# Patient Record
Sex: MALE | Race: WHITE | NOT HISPANIC OR LATINO | Employment: FULL TIME | URBAN - METROPOLITAN AREA
[De-identification: names, ages, dates, MRNs, and addresses within clinical notes are randomized per-mention and may not be internally consistent; named-entity substitution may affect disease eponyms.]

---

## 2018-01-12 NOTE — MISCELLANEOUS
Message   Recorded as Task   Date: 01/19/2016 11:13 AM, Created By: Gloria Zabala   Task Name: Miscellaneous   Assigned To: Sneha Saunders   Regarding Patient: Wally Gomez, Status: Active   CommentFilomena Smoke - 19 Jan 2016 11:13 AM     TASK CREATED  Pt lm to cx his LESI w/ you on 1/21, he cannot make it due to work, he will c b to r/s  Thank you   Hubert Alba - 19 Jan 2016 11:19 AM     TASK REPLIED TO: Previously Assigned To Hubert Alba  aware        Active Problems    1  Herniated lumbar disc without myelopathy (722 10) (M51 26)   2  Low back pain (724 2) (M54 5)   3  Pain in thoracic spine (724 1) (M54 6)   4  Spasm (781 0) (R25 2)    Current Meds   1  Azithromycin 250 MG Oral Tablet; Therapy: 93UVH3377 to (Last Rx:32Lsv3956)  Requested for: 28XFJ7927 Ordered   2  Gabapentin 100 MG Oral Capsule; Take one tablet at bedtime x4 days, then take 2   tablets at bedtime x4 days, then 3 tablets at bedtime; Therapy: 58GOE1428 to (Evaluate:29Qon4483)  Requested for: 12Jan2016; Last   Rx:12Jan2016 Ordered   3  Hydrocodone-Acetaminophen 7 5-500 MG TABS; Therapy: 79GJS1176 to (Last Rx:93Qjo2367)  Requested for: 12WZJ5312 Ordered   4  Hydrocodone-Acetaminophen 7 5-750 MG TABS; Therapy: 00GCY1929 to (Last Nena Oz)  Requested for: 23Nov2011 Ordered   5  Methocarbamol 750 MG Oral Tablet; Take 1 po tid prn spasms; Therapy: 34EZI7877 to (Evaluate:54Jgs2497)  Requested for: 02DRM2536; Last   Rx:13Jan2015 Ordered   6  Naproxen 500 MG Oral Tablet; Therapy: 83NYE8545 to (Last Rx:02Nov2011)  Requested for: 46IOD6775 Ordered   7  Pantoprazole Sodium 40 MG Oral Tablet Delayed Release; Therapy: 05RGD9317 to (Last Rx:30Nov2011)  Requested for: 13VXP8125 Ordered   8  Zolpidem Tartrate 10 MG Oral Tablet; Therapy: 76UAJ4351 to (Last Rx:30Nov2011)  Requested for: 18RXI6040 Ordered    Allergies    1  Amoxicillin TABS   2   Penicillins    Signatures   Electronically signed by : Kiko Montano, ; Jan 19 2016 11: 23AM EST                       (Author)

## 2023-07-18 ENCOUNTER — CONSULT (OUTPATIENT)
Dept: PAIN MEDICINE | Facility: CLINIC | Age: 43
End: 2023-07-18
Payer: COMMERCIAL

## 2023-07-18 VITALS
HEART RATE: 79 BPM | SYSTOLIC BLOOD PRESSURE: 130 MMHG | TEMPERATURE: 98.1 F | BODY MASS INDEX: 32.08 KG/M2 | WEIGHT: 250 LBS | DIASTOLIC BLOOD PRESSURE: 86 MMHG | HEIGHT: 74 IN

## 2023-07-18 DIAGNOSIS — M54.12 CERVICAL RADICULOPATHY: ICD-10-CM

## 2023-07-18 DIAGNOSIS — M48.02 FORAMINAL STENOSIS OF CERVICAL REGION: Primary | ICD-10-CM

## 2023-07-18 PROCEDURE — 99204 OFFICE O/P NEW MOD 45 MIN: CPT | Performed by: ANESTHESIOLOGY

## 2023-07-18 RX ORDER — ZOLPIDEM TARTRATE 12.5 MG/1
10 TABLET, FILM COATED, EXTENDED RELEASE ORAL
COMMUNITY

## 2023-07-18 RX ORDER — BUPROPION HYDROCHLORIDE 300 MG/1
300 TABLET ORAL DAILY
COMMUNITY

## 2023-07-18 RX ORDER — CETIRIZINE HYDROCHLORIDE 10 MG/1
10 TABLET ORAL DAILY
COMMUNITY

## 2023-07-18 RX ORDER — ESOMEPRAZOLE MAGNESIUM 20 MG/1
20 FOR SUSPENSION ORAL
COMMUNITY

## 2023-07-18 NOTE — PROGRESS NOTES
Assessment  1. Foraminal stenosis of cervical region    2. Cervical radiculopathy        Plan    The patient's pain persists despite time, relative rest, activity modification and practic treatment. I believe that Beth Addison may benefit from cervical epidural steroid injection to directly diminish any inflammatory component of his pain. I will initially use an translaminar approach. If the patient does not receive significant pain relief following the initial injection, I may need to repeat using a transforaminal approach that may allow for better concentrate of the steroid along the affected nerve root. In the office today, we reviewed the nature of the patient's pathology in depth using diagrams and models. I discussed the approach I would use for the epidural steroid injection and provided literature for home review. The patient understands the risks associated with the procedure including but not limited to bleeding, infection, tissue injury, exacerbation of symptoms, allergic reaction, decreased immunity secondary to steroid, spinal headache, and paralysis and provided verbal consent today. My impressions and treatment recommendations were discussed in detail with the patient who verbalized understanding and had no further questions. Discharge instructions were provided. I personally saw and examined the patient and I agree with the above discussed plan of care. This note is created using dictation transcription. It may contain typographical errors, grammatical errors, improperly dictated words, background noise and other errors. Orders Placed This Encounter   Procedures   • FL spine and pain procedure     Standing Status:   Future     Standing Expiration Date:   7/18/2027     Order Specific Question:   Reason for Exam:     Answer:   MASOOD to the right     Order Specific Question:   Anticoagulant hold needed?      Answer:   no   • FL spine and pain procedure     Standing Status:   Future     Standing Expiration Date:   7/18/2027     Order Specific Question:   Reason for Exam:     Answer:   MASOOD to the right 2     Order Specific Question:   Anticoagulant hold needed? Answer:   no     New Medications Ordered This Visit   Medications   • esomeprazole (NexIUM) 20 mg packet     Sig: Take 20 mg by mouth every morning before breakfast   • sertraline (ZOLOFT) 50 mg tablet     Sig: Take 50 mg by mouth daily   • buPROPion (WELLBUTRIN XL) 300 mg 24 hr tablet     Sig: Take 300 mg by mouth daily   • cetirizine (ZyrTEC) 10 mg tablet     Sig: Take 10 mg by mouth daily   • zolpidem (AMBIEN CR) 12.5 MG CR tablet     Sig: Take 10 mg by mouth daily at bedtime as needed for sleep       History of Present Illness    Tucker Jones is a 41 y.o. male with 9 to 10-month history of right-sided shoulder pain with numbness tingling down his right arm. Unaware of any clear present event denies any trauma or injury. Is undergone chiropractic treatment but his pain is moderate to severe he rates as 8 out of 10 the visual analog scale interfering with his daily living activities. His pain is intermittent described as sharp with a pins-and-needles and pressure sensation. He denies any weakness. Lying down decreases symptoms while standing and sitting aggravates his pain. I have personally reviewed and/or updated the patient's past medical history, past surgical history, family history, social history, current medications, allergies, and vital signs today. Review of Systems   Full system review was negative    There is no problem list on file for this patient. Past Medical History:   Diagnosis Date   • Anxiety    • GERD (gastroesophageal reflux disease)        History reviewed. No pertinent surgical history. History reviewed. No pertinent family history.     Social History     Occupational History   • Not on file   Tobacco Use   • Smoking status: Never   • Smokeless tobacco: Never   Substance and Sexual Activity   • Alcohol use: Yes   • Drug use: Not on file   • Sexual activity: Not on file       Current Outpatient Medications on File Prior to Visit   Medication Sig   • buPROPion (WELLBUTRIN XL) 300 mg 24 hr tablet Take 300 mg by mouth daily   • cetirizine (ZyrTEC) 10 mg tablet Take 10 mg by mouth daily   • esomeprazole (NexIUM) 20 mg packet Take 20 mg by mouth every morning before breakfast   • sertraline (ZOLOFT) 50 mg tablet Take 50 mg by mouth daily   • zolpidem (AMBIEN CR) 12.5 MG CR tablet Take 10 mg by mouth daily at bedtime as needed for sleep     No current facility-administered medications on file prior to visit. Allergies   Allergen Reactions   • Penicillins Rash       Physical Exam    /86 (BP Location: Left arm, Patient Position: Sitting, Cuff Size: Standard)   Pulse 79   Temp 98.1 °F (36.7 °C)   Ht 6' 2" (1.88 m)   Wt 113 kg (250 lb)   BMI 32.10 kg/m²     Constitutional: normal, well developed, well nourished, alert, in no distress and non-toxic and no overt pain behavior. Eyes: anicteric  HEENT: grossly intact  Neck: supple, symmetric, trachea midline and no masses   Pulmonary:even and unlabored  Cardiovascular:No edema or pitting edema present  Skin:Normal without rashes or lesions and well hydrated  Psychiatric:Mood and affect appropriate  Neurologic:Cranial Nerves II-XII grossly intact  Musculoskeletal:normal,  Inspection: Normal station and gait. Normal cervical curves and head posture. Skin intact without erythema. No sensory loss. There is no atrophy. Palpation: There is no tenderness to palpation overlying the bilateral cervical paraspinals, cervical facet joints. There is no tenderness over the upper trapezius muscles bilateral. No shoulder tenderness  Motor/Strength: Equal strength in the bilateral upper extremities  Reflexes: equal and symmetric in the upper limbs. Sensation: Sensation intact to light touch and pinprick in the upper limbs.    Maneuvers: Positive right Spurling's maneuver. Negative Lhermitte's sign. Imaging    MRI CERVICAL SPINE @ [facility] 6-28-23  Impression:     Multilevel cervical spondylosis     Spinal canal narrowing greatest at C5-C6 where there is a mild to moderate   spinal canal stenosis and mild cord flattening. No yandy cord compression or   cord edema seen. Multilevel high-grade neural foraminal stenoses including severe left and   moderate to severe right C4-C5, and severe bilateral C5-C6 neural foraminal   stenoses. Question of impingement of the exiting right C7 nerve root by a right   foraminal protrusion. Moderate to severe left C6-C7 neural foraminal stenosis. Suspected discogenic edema eccentric to the left at C5-C6     I have personally reviewed pertinent films in PACS and my interpretation is To level foraminal stenosis with spondylosis.

## 2023-08-02 ENCOUNTER — TELEPHONE (OUTPATIENT)
Age: 43
End: 2023-08-02

## 2023-08-02 NOTE — TELEPHONE ENCOUNTER
Caller: patient    Doctor: Ramandeep Frazier    Reason for call: patient would like to cancel procedure.  Will call back to reschedule    Call back#:

## 2023-09-22 ENCOUNTER — APPOINTMENT (EMERGENCY)
Dept: RADIOLOGY | Facility: HOSPITAL | Age: 43
End: 2023-09-22
Payer: COMMERCIAL

## 2023-09-22 ENCOUNTER — APPOINTMENT (EMERGENCY)
Dept: CT IMAGING | Facility: HOSPITAL | Age: 43
End: 2023-09-22
Payer: COMMERCIAL

## 2023-09-22 ENCOUNTER — HOSPITAL ENCOUNTER (EMERGENCY)
Facility: HOSPITAL | Age: 43
Discharge: HOME | End: 2023-09-22
Attending: EMERGENCY MEDICINE
Payer: COMMERCIAL

## 2023-09-22 VITALS
TEMPERATURE: 98.8 F | OXYGEN SATURATION: 94 % | DIASTOLIC BLOOD PRESSURE: 80 MMHG | HEART RATE: 76 BPM | RESPIRATION RATE: 18 BRPM | BODY MASS INDEX: 32.08 KG/M2 | WEIGHT: 250 LBS | HEIGHT: 74 IN | SYSTOLIC BLOOD PRESSURE: 122 MMHG

## 2023-09-22 DIAGNOSIS — M21.822 HILL SACHS DEFORMITY, LEFT: ICD-10-CM

## 2023-09-22 DIAGNOSIS — S43.006A SHOULDER DISLOCATION: Primary | ICD-10-CM

## 2023-09-22 LAB
ABO GROUP BLD: NORMAL
ABO GROUP BLD: NORMAL
ALBUMIN SERPL BCP-MCNC: 4.8 G/DL (ref 3.5–5)
ALP SERPL-CCNC: 62 U/L (ref 34–104)
ALT SERPL W P-5'-P-CCNC: 33 U/L (ref 7–52)
ANION GAP SERPL CALCULATED.3IONS-SCNC: 10 MMOL/L
AST SERPL W P-5'-P-CCNC: 44 U/L (ref 13–39)
BASOPHILS # BLD AUTO: 0.04 THOUSANDS/ÂΜL (ref 0–0.1)
BASOPHILS NFR BLD AUTO: 0 % (ref 0–1)
BILIRUB SERPL-MCNC: 0.53 MG/DL (ref 0.2–1)
BLD GP AB SCN SERPL QL: NEGATIVE
BUN SERPL-MCNC: 13 MG/DL (ref 5–25)
CALCIUM SERPL-MCNC: 9.4 MG/DL (ref 8.4–10.2)
CHLORIDE SERPL-SCNC: 98 MMOL/L (ref 96–108)
CO2 SERPL-SCNC: 26 MMOL/L (ref 21–32)
CREAT SERPL-MCNC: 1.11 MG/DL (ref 0.6–1.3)
EOSINOPHIL # BLD AUTO: 0.11 THOUSAND/ÂΜL (ref 0–0.61)
EOSINOPHIL NFR BLD AUTO: 1 % (ref 0–6)
ERYTHROCYTE [DISTWIDTH] IN BLOOD BY AUTOMATED COUNT: 11.7 % (ref 11.6–15.1)
GFR SERPL CREATININE-BSD FRML MDRD: 82 ML/MIN/1.73SQ M
GLUCOSE SERPL-MCNC: 99 MG/DL (ref 65–140)
HCT VFR BLD AUTO: 40 % (ref 36.5–49.3)
HGB BLD-MCNC: 14.4 G/DL (ref 12–17)
IMM GRANULOCYTES # BLD AUTO: 0.06 THOUSAND/UL (ref 0–0.2)
IMM GRANULOCYTES NFR BLD AUTO: 1 % (ref 0–2)
LYMPHOCYTES # BLD AUTO: 2.71 THOUSANDS/ÂΜL (ref 0.6–4.47)
LYMPHOCYTES NFR BLD AUTO: 24 % (ref 14–44)
MCH RBC QN AUTO: 31.7 PG (ref 26.8–34.3)
MCHC RBC AUTO-ENTMCNC: 36 G/DL (ref 31.4–37.4)
MCV RBC AUTO: 88 FL (ref 82–98)
MONOCYTES # BLD AUTO: 0.64 THOUSAND/ÂΜL (ref 0.17–1.22)
MONOCYTES NFR BLD AUTO: 6 % (ref 4–12)
NEUTROPHILS # BLD AUTO: 7.67 THOUSANDS/ÂΜL (ref 1.85–7.62)
NEUTS SEG NFR BLD AUTO: 68 % (ref 43–75)
NRBC BLD AUTO-RTO: 0 /100 WBCS
PLATELET # BLD AUTO: 306 THOUSANDS/UL (ref 149–390)
PMV BLD AUTO: 9.8 FL (ref 8.9–12.7)
POTASSIUM SERPL-SCNC: 4 MMOL/L (ref 3.5–5.3)
PROT SERPL-MCNC: 7.6 G/DL (ref 6.4–8.4)
RBC # BLD AUTO: 4.54 MILLION/UL (ref 3.88–5.62)
RH BLD: POSITIVE
RH BLD: POSITIVE
SODIUM SERPL-SCNC: 134 MMOL/L (ref 135–147)
SPECIMEN EXPIRATION DATE: NORMAL
WBC # BLD AUTO: 11.23 THOUSAND/UL (ref 4.31–10.16)

## 2023-09-22 PROCEDURE — 99284 EMERGENCY DEPT VISIT MOD MDM: CPT

## 2023-09-22 PROCEDURE — 74177 CT ABD & PELVIS W/CONTRAST: CPT

## 2023-09-22 PROCEDURE — 71045 X-RAY EXAM CHEST 1 VIEW: CPT

## 2023-09-22 PROCEDURE — 80053 COMPREHEN METABOLIC PANEL: CPT

## 2023-09-22 PROCEDURE — 72125 CT NECK SPINE W/O DYE: CPT

## 2023-09-22 PROCEDURE — 36415 COLL VENOUS BLD VENIPUNCTURE: CPT

## 2023-09-22 PROCEDURE — 70450 CT HEAD/BRAIN W/O DYE: CPT

## 2023-09-22 PROCEDURE — 90471 IMMUNIZATION ADMIN: CPT

## 2023-09-22 PROCEDURE — 71260 CT THORAX DX C+: CPT

## 2023-09-22 PROCEDURE — 85025 COMPLETE CBC W/AUTO DIFF WBC: CPT

## 2023-09-22 PROCEDURE — 86850 RBC ANTIBODY SCREEN: CPT

## 2023-09-22 PROCEDURE — 99285 EMERGENCY DEPT VISIT HI MDM: CPT

## 2023-09-22 PROCEDURE — 86900 BLOOD TYPING SEROLOGIC ABO: CPT

## 2023-09-22 PROCEDURE — 96374 THER/PROPH/DIAG INJ IV PUSH: CPT

## 2023-09-22 PROCEDURE — 90715 TDAP VACCINE 7 YRS/> IM: CPT

## 2023-09-22 PROCEDURE — 73020 X-RAY EXAM OF SHOULDER: CPT

## 2023-09-22 PROCEDURE — 23650 CLTX SHO DSLC W/MNPJ WO ANES: CPT

## 2023-09-22 PROCEDURE — 86901 BLOOD TYPING SEROLOGIC RH(D): CPT

## 2023-09-22 PROCEDURE — 73030 X-RAY EXAM OF SHOULDER: CPT

## 2023-09-22 RX ORDER — HYDROMORPHONE HCL/PF 1 MG/ML
0.5 SYRINGE (ML) INJECTION ONCE
Status: COMPLETED | OUTPATIENT
Start: 2023-09-22 | End: 2023-09-22

## 2023-09-22 RX ORDER — MORPHINE SULFATE 4 MG/ML
4 INJECTION, SOLUTION INTRAMUSCULAR; INTRAVENOUS ONCE
Status: DISCONTINUED | OUTPATIENT
Start: 2023-09-22 | End: 2023-09-22

## 2023-09-22 RX ORDER — SODIUM CHLORIDE 9 MG/ML
3 INJECTION INTRAVENOUS
Status: DISCONTINUED | OUTPATIENT
Start: 2023-09-22 | End: 2023-09-22

## 2023-09-22 RX ORDER — NITROGLYCERIN 0.4 MG/1
0.4 TABLET SUBLINGUAL
Status: DISCONTINUED | OUTPATIENT
Start: 2023-09-22 | End: 2023-09-22

## 2023-09-22 RX ADMIN — IOHEXOL 100 ML: 350 INJECTION, SOLUTION INTRAVENOUS at 19:22

## 2023-09-22 RX ADMIN — TETANUS TOXOID, REDUCED DIPHTHERIA TOXOID AND ACELLULAR PERTUSSIS VACCINE, ADSORBED 0.5 ML: 5; 2.5; 8; 8; 2.5 SUSPENSION INTRAMUSCULAR at 19:38

## 2023-09-22 RX ADMIN — HYDROMORPHONE HYDROCHLORIDE 0.5 MG: 1 INJECTION, SOLUTION INTRAMUSCULAR; INTRAVENOUS; SUBCUTANEOUS at 19:12
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
## 2023-09-23 NOTE — ED ATTENDING ATTESTATION
9/22/2023  I, Chaim Suárez MD, saw and evaluated the patient. I have discussed the patient with the resident/non-physician practitioner and agree with the resident's/non-physician practitioner's findings, Plan of Care, and MDM as documented in the resident's/non-physician practitioner's note, except where noted. All available labs and Radiology studies were reviewed. I was present for key portions of any procedure(s) performed by the resident/non-physician practitioner and I was immediately available to provide assistance. At this point I agree with the current assessment done in the Emergency Department. I have conducted an independent evaluation of this patient a history and physical is as follows: On examination:  The patient is awake, alert and oriented  HEENT: Normocephalic/atraumatic  External examination of the ears is unremarkable  Pupils are equal round and reactive to light, there is no conjunctival injection or scleral icterus noted  Nares are patent without rhinorrhea. The oropharynx is moist without injection  The neck is supple  Lungs: Equal chest rise  Heart: Good peripheral perfusion  Abdomen: Non distended  Musculoskeletal: Obvious deformity of left shoulder with decreased mobility  Neuro: Nonfocal exam  Skin: No rash noted  Psych: Mood and affect normal      ED Course   Shoulder successfully reduced by me in the emergency department. Scans demonstrated no further injuries. Patient appeared clinically well.     Critical Care Time  Procedures

## 2023-09-23 NOTE — DISCHARGE INSTRUCTIONS
Keep your arm in sling until cleared by orthopedics. A referral for [facility]  ortho was placed. You have a Hill-Sachs deformity which is a type of fracture related to shoulder dislocation. Take tylenol and motrin as needed for pain.

## 2023-09-26 ENCOUNTER — OFFICE VISIT (OUTPATIENT)
Dept: OBGYN CLINIC | Facility: CLINIC | Age: 43
End: 2023-09-26
Payer: COMMERCIAL

## 2023-09-26 VITALS
HEIGHT: 74 IN | BODY MASS INDEX: 32.1 KG/M2 | HEART RATE: 98 BPM | SYSTOLIC BLOOD PRESSURE: 158 MMHG | DIASTOLIC BLOOD PRESSURE: 89 MMHG

## 2023-09-26 DIAGNOSIS — S43.006A SHOULDER DISLOCATION: Primary | ICD-10-CM

## 2023-09-26 DIAGNOSIS — T15.90XA FOREIGN BODY IN EYE, UNSPECIFIED LATERALITY, INITIAL ENCOUNTER: ICD-10-CM

## 2023-09-26 DIAGNOSIS — M21.822 HILL SACHS DEFORMITY, LEFT: ICD-10-CM

## 2023-09-26 PROCEDURE — 99203 OFFICE O/P NEW LOW 30 MIN: CPT | Performed by: ORTHOPAEDIC SURGERY

## 2023-09-26 NOTE — PROGRESS NOTES
Assessment/Plan:   Diagnoses and all orders for this visit:    Shoulder dislocation  -     Ambulatory Referral to Orthopedic Surgery  -     MRI shoulder left wo contrast; Future  -     XR orbits for foreign body; Future    Hill Sachs deformity, left  -     Ambulatory Referral to Orthopedic Surgery  -     MRI shoulder left wo contrast; Future    Foreign body in eye, unspecified laterality, initial encounter  -     XR orbits for foreign body; Future         Reviewed physical exam and imaging with patient at time of visit. The patient experienced a left shoulder dislocation on 9/22/23, his symptoms are consistent with a possible rotator cuff tear and a labral tear of his left shoulder. An MRI was ordered for further evaluation. He was instructed to return to wearing the sling. He was advised to use his right arm only while working. He should refrain from active range of motion. He will follow-up once the MRI is complete. The patient expresses understanding and is in agreement with today's treatment plan. The patient has a dislocation of his left shoulder that was successfully reduced in the emergency room. He is showing signs of rotator cuff weakness. He is not wearing a sling today. Would recommend continued use of sling. An MRI is ordered to look for rotator cuff pathology, and even possible labral pathology. Return back once studies complete      Subjective:   Patient ID: Young Lord  1980     HPI  Patient is a 41 y.o. male who presents for initial evaluation of his left shoulder. The patient is right-hand dominant. He states that he was golfing on Friday 9/22/23, when he fell out of the golf cart and dislocated his left shoulder. He reported to his local ED where his shoulder was reduced and he was placed in a sling. The patient discontinued use of the sling at his own discretion due to stiffness. He states that he continues to experience pain in his shoulder that is exacerbated with movement.  He denies numbness or tingling. The following portions of the patient's history were reviewed and updated as appropriate:  Past medical history, past surgical history, Family history, social history, current medications and allergies    Past Medical History:   Diagnosis Date   • Anxiety    • GERD (gastroesophageal reflux disease)        History reviewed. No pertinent surgical history. History reviewed. No pertinent family history.     Social History     Socioeconomic History   • Marital status:      Spouse name: None   • Number of children: None   • Years of education: None   • Highest education level: None   Occupational History   • None   Tobacco Use   • Smoking status: Never   • Smokeless tobacco: Never   Vaping Use   • Vaping Use: Every day   • Substances: Nicotine, THC, CBD, Flavoring   Substance and Sexual Activity   • Alcohol use: Yes     Comment: very little   • Drug use: Yes     Types: Marijuana     Comment: THC vape   • Sexual activity: None   Other Topics Concern   • None   Social History Narrative   • None     Social Determinants of Health     Financial Resource Strain: Not on file   Food Insecurity: Not on file   Transportation Needs: Not on file   Physical Activity: Not on file   Stress: Not on file   Social Connections: Not on file   Intimate Partner Violence: Not on file   Housing Stability: Not on file         Current Outpatient Medications:   •  buPROPion (WELLBUTRIN XL) 300 mg 24 hr tablet, Take 300 mg by mouth daily, Disp: , Rfl:   •  cetirizine (ZyrTEC) 10 mg tablet, Take 10 mg by mouth daily, Disp: , Rfl:   •  esomeprazole (NexIUM) 20 mg packet, Take 20 mg by mouth every morning before breakfast, Disp: , Rfl:   •  sertraline (ZOLOFT) 50 mg tablet, Take 50 mg by mouth daily, Disp: , Rfl:   •  zolpidem (AMBIEN CR) 12.5 MG CR tablet, Take 10 mg by mouth daily at bedtime as needed for sleep, Disp: , Rfl:     Allergies   Allergen Reactions   • Penicillins Rash       Review of Systems Constitutional: Negative for chills and fever. HENT: Negative for ear pain and sore throat. Eyes: Negative for pain and visual disturbance. Respiratory: Negative for cough and shortness of breath. Cardiovascular: Negative for chest pain and palpitations. Gastrointestinal: Negative for abdominal pain and vomiting. Genitourinary: Negative for dysuria and hematuria. Musculoskeletal: Negative for arthralgias and back pain. Skin: Negative for color change and rash. Neurological: Negative for seizures and syncope. All other systems reviewed and are negative. Objective:  /89 (BP Location: Left arm, Patient Position: Sitting, Cuff Size: Large)   Pulse 98   Ht 6' 2" (1.88 m)   BMI 32.10 kg/m²     Ortho Exam  left Shoulder -   No anatomical deformity  Skin is warm and dry to touch with no signs of erythema, ecchymosis, or infection  No soft tissue swelling or effusion noted  No palpable crepitus with passive motion  TTP over anterior shoulder joint, supraspinatus   Mild glenohumeral instability appreciated on bilateral shoulders   Demonstrates normal elbow, wrist, and finger motion  2+  distal radial pulse with brisk capillary refill to the fingers  Radial, median, ulnar and motor  and sensory distribution intact  Sensation to light touch intact distally      Physical Exam  HENT:      Head: Normocephalic and atraumatic. Nose: Nose normal.   Eyes:      Conjunctiva/sclera: Conjunctivae normal.   Cardiovascular:      Rate and Rhythm: Normal rate. Pulmonary:      Effort: Pulmonary effort is normal.   Musculoskeletal:      Cervical back: Neck supple. Skin:     General: Skin is warm and dry. Capillary Refill: Capillary refill takes less than 2 seconds. Neurological:      Mental Status: He is alert and oriented to person, place, and time. Psychiatric:         Mood and Affect: Mood normal.         Behavior: Behavior normal.          Diagnostic Test Review:   The attending physician has personally reviewed the pertinent films in PACS and the interpretation is as follows:    X-Ray of left shoulder taken on 9/22/23 were reviewed and showed Successful reduction of left shoulder dislocation. Hill-Sachs deformity in the lateral aspect of the left humeral head. No other evidence of fracture. Procedures   None performed.     Scribe Attestation    I,:  Carolyn Jeffery am acting as a scribe while in the presence of the attending physician.:       I,:  Rosalina Brar DO personally performed the services described in this documentation    as scribed in my presence.:

## 2023-10-06 ENCOUNTER — HOSPITAL ENCOUNTER (OUTPATIENT)
Dept: MRI IMAGING | Facility: HOSPITAL | Age: 43
End: 2023-10-06
Attending: ORTHOPAEDIC SURGERY
Payer: COMMERCIAL

## 2023-10-06 ENCOUNTER — HOSPITAL ENCOUNTER (OUTPATIENT)
Dept: RADIOLOGY | Facility: HOSPITAL | Age: 43
End: 2023-10-06
Payer: COMMERCIAL

## 2023-10-06 DIAGNOSIS — T15.90XA FOREIGN BODY IN EYE, UNSPECIFIED LATERALITY, INITIAL ENCOUNTER: ICD-10-CM

## 2023-10-06 DIAGNOSIS — S43.006A SHOULDER DISLOCATION: ICD-10-CM

## 2023-10-06 DIAGNOSIS — M21.822 HILL SACHS DEFORMITY, LEFT: ICD-10-CM

## 2023-10-06 PROCEDURE — 73221 MRI JOINT UPR EXTREM W/O DYE: CPT

## 2023-10-06 PROCEDURE — 70030 X-RAY EYE FOR FOREIGN BODY: CPT

## 2023-10-06 PROCEDURE — G1004 CDSM NDSC: HCPCS

## 2023-10-13 ENCOUNTER — OFFICE VISIT (OUTPATIENT)
Dept: OBGYN CLINIC | Facility: CLINIC | Age: 43
End: 2023-10-13
Payer: COMMERCIAL

## 2023-10-13 VITALS
WEIGHT: 250 LBS | HEIGHT: 74 IN | BODY MASS INDEX: 32.08 KG/M2 | SYSTOLIC BLOOD PRESSURE: 144 MMHG | DIASTOLIC BLOOD PRESSURE: 81 MMHG | HEART RATE: 81 BPM

## 2023-10-13 DIAGNOSIS — M21.822 HILL SACHS DEFORMITY, LEFT: Primary | ICD-10-CM

## 2023-10-13 DIAGNOSIS — S43.492A BANKART LESION OF LEFT SHOULDER, INITIAL ENCOUNTER: ICD-10-CM

## 2023-10-13 PROCEDURE — 99213 OFFICE O/P EST LOW 20 MIN: CPT | Performed by: ORTHOPAEDIC SURGERY

## 2023-10-13 NOTE — PROGRESS NOTES
Assessment/Plan:   Diagnoses and all orders for this visit:    Miladis Moeller deformity, left    Bankart lesion of left shoulder, initial encounter         Reviewed physical exam and MRI with patient at time of visit. MRI findings demonstrate a tear of his labrum of his left shoulder. Discussed with patient that he may be more susceptible to recurrent shoulder dislocations with a tear of his labrum. He may discontinue use of the sling. Discussed the patient's diagnosis and associated treatment options including conservative and surgical treatment. Discussed risks, potential complications, and benefits of surgical procedure in great detail. The patient understands the risks and benefits of all mention treatment options and has no further questions. The patient has elected to proceed forward with shoulder arthroscopy with possible labral repair of his left shoulder. Surgery is tentatively scheduled for December 13, 2023. He will be seen for follow-up 10 to 14 days post-op for reevaluation. A surgical consent was obtained at today's visit. The patient expressed understanding and had the opportunity to ask questions. The patient has a labral tear of his left shoulder. Treatment options were discussed. He is opted for elective arthroscopy his left shoulder with possible labral repair. All risk, complications, and benefits were discussed in great detail including bleeding, infection, blood clots, pain, stiffness, nerve damage, fractures, dislocations, the possibility loss ligament surgery, recurrent stability, cartilage damage, ligament damage, etc.  His surgery scheduled for December 13, 2023 as per his request due to his work demands        Subjective:   Patient ID: Merdis Horace  1980     HPI  Patient is a 41 y.o. male who presents for follow-up evaluation and MRI read. The patient was last seen on 9/26/23 where an MRI was ordered for evaluation of his shoulder.  The patient is approximately 3 weeks post-injury, occurring on 9/22/23, when falling out of a golf cart. On today's presentation, he reports mild pain throughout the day. He states that he has no pain with every day activities, however, he experiences pain with overhead activities. He describes some feelings of instability with movement. He denies numbness and tingling. The following portions of the patient's history were reviewed and updated as appropriate:  Past medical history, past surgical history, Family history, social history, current medications and allergies    Past Medical History:   Diagnosis Date    Anxiety     GERD (gastroesophageal reflux disease)        History reviewed. No pertinent surgical history. History reviewed. No pertinent family history.     Social History     Socioeconomic History    Marital status:      Spouse name: None    Number of children: None    Years of education: None    Highest education level: None   Occupational History    None   Tobacco Use    Smoking status: Never    Smokeless tobacco: Never   Vaping Use    Vaping Use: Every day    Substances: Nicotine, THC, CBD, Flavoring   Substance and Sexual Activity    Alcohol use: Yes     Comment: very little    Drug use: Yes     Types: Marijuana     Comment: THC vape    Sexual activity: None   Other Topics Concern    None   Social History Narrative    None     Social Determinants of Health     Financial Resource Strain: Not on file   Food Insecurity: Not on file   Transportation Needs: Not on file   Physical Activity: Not on file   Stress: Not on file   Social Connections: Not on file   Intimate Partner Violence: Not on file   Housing Stability: Not on file         Current Outpatient Medications:     buPROPion (WELLBUTRIN XL) 300 mg 24 hr tablet, Take 300 mg by mouth daily, Disp: , Rfl:     cetirizine (ZyrTEC) 10 mg tablet, Take 10 mg by mouth daily, Disp: , Rfl:     esomeprazole (NexIUM) 20 mg packet, Take 20 mg by mouth every morning before breakfast, Disp: , Rfl:     sertraline (ZOLOFT) 50 mg tablet, Take 50 mg by mouth daily, Disp: , Rfl:     zolpidem (AMBIEN CR) 12.5 MG CR tablet, Take 10 mg by mouth daily at bedtime as needed for sleep, Disp: , Rfl:     Allergies   Allergen Reactions    Penicillins Rash       Review of Systems   Constitutional:  Negative for chills and fever. HENT:  Negative for ear pain and sore throat. Eyes:  Negative for pain and visual disturbance. Respiratory:  Negative for cough and shortness of breath. Cardiovascular:  Negative for chest pain and palpitations. Gastrointestinal:  Negative for abdominal pain and vomiting. Genitourinary:  Negative for dysuria and hematuria. Musculoskeletal:  Negative for arthralgias and back pain. Skin:  Negative for color change and rash. Neurological:  Negative for seizures and syncope. All other systems reviewed and are negative. Objective:  /81 (BP Location: Left arm, Patient Position: Sitting, Cuff Size: Large)   Pulse 81   Ht 6' 2" (1.88 m)   Wt 113 kg (250 lb)   BMI 32.10 kg/m²     Ortho Exam  left Shoulder -   No anatomical deformity  Skin is warm and dry to touch with no signs of erythema, ecchymosis, or infection  No soft tissue swelling or effusion noted  No palpable crepitus with passive motion  TTP over anterior shoulder joint  Mild glenohumeral instability appreciated on exam  Demonstrates normal elbow, wrist, and finger motion  2+  distal radial pulse with brisk capillary refill to the fingers  Radial, median, ulnar and motor  and sensory distribution intact  Sensation to light touch intact distally      Physical Exam  HENT:      Head: Normocephalic and atraumatic. Nose: Nose normal.   Eyes:      Conjunctiva/sclera: Conjunctivae normal.   Cardiovascular:      Rate and Rhythm: Normal rate. Pulmonary:      Effort: Pulmonary effort is normal.   Musculoskeletal:      Cervical back: Neck supple. Skin:     General: Skin is warm and dry. Capillary Refill: Capillary refill takes less than 2 seconds. Neurological:      Mental Status: He is alert and oriented to person, place, and time. Psychiatric:         Mood and Affect: Mood normal.         Behavior: Behavior normal.          Diagnostic Test Review: The attending physician has personally reviewed the pertinent films in PACS and the interpretation is as follows:    MRI of left shoulder taken on 10/6/23 were reviewed and demonstrate  Large Hill-Sachs deformity  Large, displaced anterior to anterior inferior glenoid labral tear  Broad area of articular surface fraying of the anterior infraspinatus and posterior supraspinatus, without full-thickness rotator cuff tear      Procedures   None performed.     Scribe Attestation      I,:  Carolyn Jeffery am acting as a scribe while in the presence of the attending physician.:       I,:  Dexter Kruse DO personally performed the services described in this documentation    as scribed in my presence.:

## 2023-10-19 ENCOUNTER — PREP FOR PROCEDURE (OUTPATIENT)
Dept: OBGYN CLINIC | Facility: CLINIC | Age: 43
End: 2023-10-19

## 2023-10-19 DIAGNOSIS — S43.492A BANKART LESION OF LEFT SHOULDER, INITIAL ENCOUNTER: Primary | ICD-10-CM
